# Patient Record
Sex: MALE | Race: BLACK OR AFRICAN AMERICAN | NOT HISPANIC OR LATINO | Employment: FULL TIME | URBAN - NONMETROPOLITAN AREA
[De-identification: names, ages, dates, MRNs, and addresses within clinical notes are randomized per-mention and may not be internally consistent; named-entity substitution may affect disease eponyms.]

---

## 2023-11-07 ENCOUNTER — OFFICE VISIT (OUTPATIENT)
Dept: FAMILY MEDICINE | Facility: CLINIC | Age: 48
End: 2023-11-07
Payer: COMMERCIAL

## 2023-11-07 VITALS
DIASTOLIC BLOOD PRESSURE: 84 MMHG | BODY MASS INDEX: 23.03 KG/M2 | HEIGHT: 72 IN | OXYGEN SATURATION: 96 % | HEART RATE: 74 BPM | TEMPERATURE: 98 F | SYSTOLIC BLOOD PRESSURE: 125 MMHG | WEIGHT: 170 LBS

## 2023-11-07 DIAGNOSIS — R94.31 ABNORMAL ECG: Primary | ICD-10-CM

## 2023-11-07 LAB
ANION GAP SERPL CALCULATED.3IONS-SCNC: 7 MMOL/L (ref 7–16)
BUN SERPL-MCNC: 11 MG/DL (ref 7–18)
BUN/CREAT SERPL: 9 (ref 6–20)
CALCIUM SERPL-MCNC: 9.6 MG/DL (ref 8.5–10.1)
CHLORIDE SERPL-SCNC: 108 MMOL/L (ref 98–107)
CO2 SERPL-SCNC: 29 MMOL/L (ref 21–32)
CREAT SERPL-MCNC: 1.27 MG/DL (ref 0.7–1.3)
EGFR (NO RACE VARIABLE) (RUSH/TITUS): 69 ML/MIN/1.73M2
GLUCOSE SERPL-MCNC: 94 MG/DL (ref 74–106)
MAGNESIUM SERPL-MCNC: 2.5 MG/DL (ref 1.7–2.3)
POTASSIUM SERPL-SCNC: 4.1 MMOL/L (ref 3.5–5.1)
SODIUM SERPL-SCNC: 140 MMOL/L (ref 136–145)

## 2023-11-07 PROCEDURE — 3008F BODY MASS INDEX DOCD: CPT | Mod: ,,, | Performed by: STUDENT IN AN ORGANIZED HEALTH CARE EDUCATION/TRAINING PROGRAM

## 2023-11-07 PROCEDURE — 3074F PR MOST RECENT SYSTOLIC BLOOD PRESSURE < 130 MM HG: ICD-10-PCS | Mod: ,,, | Performed by: STUDENT IN AN ORGANIZED HEALTH CARE EDUCATION/TRAINING PROGRAM

## 2023-11-07 PROCEDURE — 80048 BASIC METABOLIC PNL TOTAL CA: CPT | Mod: ,,, | Performed by: CLINICAL MEDICAL LABORATORY

## 2023-11-07 PROCEDURE — 93005 PR ELECTROCARDIOGRAM, TRACING: ICD-10-PCS | Mod: ,,, | Performed by: STUDENT IN AN ORGANIZED HEALTH CARE EDUCATION/TRAINING PROGRAM

## 2023-11-07 PROCEDURE — 3079F DIAST BP 80-89 MM HG: CPT | Mod: ,,, | Performed by: STUDENT IN AN ORGANIZED HEALTH CARE EDUCATION/TRAINING PROGRAM

## 2023-11-07 PROCEDURE — 3079F PR MOST RECENT DIASTOLIC BLOOD PRESSURE 80-89 MM HG: ICD-10-PCS | Mod: ,,, | Performed by: STUDENT IN AN ORGANIZED HEALTH CARE EDUCATION/TRAINING PROGRAM

## 2023-11-07 PROCEDURE — 80048 BASIC METABOLIC PANEL: ICD-10-PCS | Mod: ,,, | Performed by: CLINICAL MEDICAL LABORATORY

## 2023-11-07 PROCEDURE — 83735 MAGNESIUM: ICD-10-PCS | Mod: ,,, | Performed by: CLINICAL MEDICAL LABORATORY

## 2023-11-07 PROCEDURE — 99203 OFFICE O/P NEW LOW 30 MIN: CPT | Mod: ,,, | Performed by: STUDENT IN AN ORGANIZED HEALTH CARE EDUCATION/TRAINING PROGRAM

## 2023-11-07 PROCEDURE — 1159F PR MEDICATION LIST DOCUMENTED IN MEDICAL RECORD: ICD-10-PCS | Mod: ,,, | Performed by: STUDENT IN AN ORGANIZED HEALTH CARE EDUCATION/TRAINING PROGRAM

## 2023-11-07 PROCEDURE — 93005 ELECTROCARDIOGRAM TRACING: CPT | Mod: ,,, | Performed by: STUDENT IN AN ORGANIZED HEALTH CARE EDUCATION/TRAINING PROGRAM

## 2023-11-07 PROCEDURE — 3008F PR BODY MASS INDEX (BMI) DOCUMENTED: ICD-10-PCS | Mod: ,,, | Performed by: STUDENT IN AN ORGANIZED HEALTH CARE EDUCATION/TRAINING PROGRAM

## 2023-11-07 PROCEDURE — 1159F MED LIST DOCD IN RCRD: CPT | Mod: ,,, | Performed by: STUDENT IN AN ORGANIZED HEALTH CARE EDUCATION/TRAINING PROGRAM

## 2023-11-07 PROCEDURE — 99203 PR OFFICE/OUTPT VISIT, NEW, LEVL III, 30-44 MIN: ICD-10-PCS | Mod: ,,, | Performed by: STUDENT IN AN ORGANIZED HEALTH CARE EDUCATION/TRAINING PROGRAM

## 2023-11-07 PROCEDURE — 3074F SYST BP LT 130 MM HG: CPT | Mod: ,,, | Performed by: STUDENT IN AN ORGANIZED HEALTH CARE EDUCATION/TRAINING PROGRAM

## 2023-11-07 PROCEDURE — 83735 ASSAY OF MAGNESIUM: CPT | Mod: ,,, | Performed by: CLINICAL MEDICAL LABORATORY

## 2023-11-07 NOTE — LETTER
November 7, 2023    Yanick Beckett  [address] [place] [id number]             [facility]  Family Medicine  [address] [place] [id number]  Phone: [phone]  Fax: [phone]   November 7, 2023     Patient: Yanick Beckett   YOB: 1975   Date of Visit: 11/7/2023       To Whom it May Concern:    Yanick Beckett was seen virtually on 11/7/2023. He may return to work on 11/08/2023 .    Please excuse him from any classes or work missed.    If you have any questions or concerns, please don't hesitate to call.    Sincerely,         Blanca Farrell FNP

## 2023-11-07 NOTE — PROGRESS NOTES
[facility]    Chief Complaint      Chief Complaint   Patient presents with    Irregular Heart Beat     States he had stress test done x3 weeks ago and was abnormal. Pt states he had irregular heartbeat. (Clinic in [place], [place] Dr. Tapia's)       History of Present Illness      Yanick Beckett is a 48 y.o. male who presents today for repeat ekg.    Pt in to clinic requesting a repeat ekg, denies having any symptoms.   was seen at a clinic in [place] for an employemnt physical and was found to have an abnormal ecg. States the staff there advised him to get ecg repeated.  Did advise pt that if repeat ecg is abnormal we will have to refer him to cardiology.  Denies having alexia cardiopulmonary symptoms.     Past Medical History:  History reviewed. No pertinent past medical history.    Past Surgical History:   has no past surgical history on file.    Social History:  Social History     Tobacco Use    Smoking status: Never     Passive exposure: Never    Smokeless tobacco: Never   Substance Use Topics    Alcohol use: Never    Drug use: Never       I personally reviewed all past medical, surgical, and social.     Review of Systems   Constitutional:  Negative for fatigue and fever.   HENT:  Negative for sore throat.    Respiratory:  Negative for shortness of breath.    Cardiovascular:  Negative for chest pain.   Gastrointestinal:  Negative for abdominal pain.   Genitourinary:  Negative for dysuria.   Musculoskeletal:  Negative for back pain.   Integumentary:  Negative for rash.   Neurological:  Negative for headaches.   All other systems reviewed and are negative.       Medications:  No outpatient encounter medications on file as of 11/7/2023.     No facility-administered encounter medications on file as of 11/7/2023.       Allergies:  Review of patient's allergies indicates:  No Known Allergies    Health Maintenance:    There is no immunization history on file for this patient.   Health Maintenance   Topic  Date Due    Hepatitis C Screening  Never done    Lipid Panel  Never done    TETANUS VACCINE  Never done    Colorectal Cancer Screening  Never done    Shingles Vaccine (1 of 2) Never done        Physical Exam      Vital Signs  Temp: 98 °F (36.7 °C)  Temp Source: Oral  Pulse: 74  SpO2: 96 %  BP: 125/84  BP Location: Left arm  Patient Position: Sitting  Height and Weight  Height: 6' (182.9 cm)  Weight: 77.1 kg (170 lb)  BSA (Calculated - sq m): 1.98 sq meters  BMI (Calculated): 23.1  Weight in (lb) to have BMI = 25: 183.9]    Physical Exam  Vitals and nursing note reviewed.   Constitutional:       Appearance: Normal appearance.   HENT:      Head: Normocephalic and atraumatic.   Cardiovascular:      Rate and Rhythm: Normal rate and regular rhythm.   Pulmonary:      Effort: Pulmonary effort is normal.      Breath sounds: Normal breath sounds.   Abdominal:      General: Bowel sounds are normal.      Palpations: Abdomen is soft.   Skin:     General: Skin is warm and dry.      Findings: No rash.   Neurological:      General: No focal deficit present.      Mental Status: He is alert and oriented to person, place, and time. Mental status is at baseline.   Psychiatric:         Mood and Affect: Mood normal.         Behavior: Behavior normal.         Thought Content: Thought content normal.         Judgment: Judgment normal.          Laboratory:  CBC:      CMP:  Recent Labs   Lab 11/07/23  1712   Glucose 94   Calcium 9.6   Sodium 140   Potassium 4.1   CO2 29   Chloride 108 H   BUN 11     LIPIDS:      TSH:      A1C:        Assessment/Plan     Yanick Beckett is a 48 y.o.male with:    1. Abnormal ECG  -     POCT EKG 12-LEAD (Manually Resulted by Ordering Provider)  -     Basic Metabolic Panel; Future; Expected date: 11/07/2023  -     Magnesium; Future; Expected date: 11/07/2023         Chronic conditions status updated as per HPI.  Other than changes above, cont current medications and maintain follow up with specialists.  Return  to clinic as needed.     Patient Instructions   We will notify you of your lab results once received.      Blanca Farrell, [facility]

## 2023-11-15 ENCOUNTER — CLINICAL SUPPORT (OUTPATIENT)
Dept: FAMILY MEDICINE | Facility: CLINIC | Age: 48
End: 2023-11-15
Payer: COMMERCIAL

## 2023-11-15 DIAGNOSIS — R94.31 ABNORMAL ECG: Primary | ICD-10-CM

## 2023-11-16 NOTE — PROGRESS NOTES
Pt in requesting one more repeat EKG before considering cardiology consult.  EKG notable for sinus bradycardia with an inverted T  wave.  Will refer to cardiology for further eval.  No acute distress.  Denies chest pain or shortness of breath.  Denies palpitations.

## 2023-12-27 DIAGNOSIS — R94.31 NONSPECIFIC ABNORMAL ELECTROCARDIOGRAM (ECG) (EKG): Primary | ICD-10-CM

## 2024-01-03 ENCOUNTER — OFFICE VISIT (OUTPATIENT)
Dept: CARDIOLOGY | Facility: CLINIC | Age: 49
End: 2024-01-03
Payer: COMMERCIAL

## 2024-01-03 VITALS
OXYGEN SATURATION: 96 % | SYSTOLIC BLOOD PRESSURE: 116 MMHG | WEIGHT: 200 LBS | BODY MASS INDEX: 27.09 KG/M2 | HEIGHT: 72 IN | DIASTOLIC BLOOD PRESSURE: 70 MMHG | HEART RATE: 75 BPM

## 2024-01-03 DIAGNOSIS — I49.8 BRUGADA PATTERN ON ELECTROCARDIOGRAM: Primary | ICD-10-CM

## 2024-01-03 DIAGNOSIS — R94.31 NONSPECIFIC ABNORMAL ELECTROCARDIOGRAM (ECG) (EKG): ICD-10-CM

## 2024-01-03 DIAGNOSIS — Z80.0 FAMILY HISTORY OF COLON CANCER REQUIRING SCREENING COLONOSCOPY: ICD-10-CM

## 2024-01-03 DIAGNOSIS — R94.31 ABNORMAL ELECTROCARDIOGRAM (ECG) (EKG): ICD-10-CM

## 2024-01-03 DIAGNOSIS — R94.31 ABNORMAL ECG: ICD-10-CM

## 2024-01-03 PROCEDURE — 3008F BODY MASS INDEX DOCD: CPT | Mod: S$GLB,,, | Performed by: HOSPITALIST

## 2024-01-03 PROCEDURE — 99214 OFFICE O/P EST MOD 30 MIN: CPT | Mod: PBBFAC | Performed by: HOSPITALIST

## 2024-01-03 PROCEDURE — 3078F DIAST BP <80 MM HG: CPT | Mod: S$GLB,,, | Performed by: HOSPITALIST

## 2024-01-03 PROCEDURE — 93000 ELECTROCARDIOGRAM COMPLETE: CPT | Mod: S$GLB,,, | Performed by: HOSPITALIST

## 2024-01-03 PROCEDURE — 3074F SYST BP LT 130 MM HG: CPT | Mod: S$GLB,,, | Performed by: HOSPITALIST

## 2024-01-03 PROCEDURE — 1159F MED LIST DOCD IN RCRD: CPT | Mod: S$GLB,,, | Performed by: HOSPITALIST

## 2024-01-03 PROCEDURE — 99205 OFFICE O/P NEW HI 60 MIN: CPT | Mod: S$GLB,,, | Performed by: HOSPITALIST

## 2024-01-03 NOTE — LETTER
January 3, 2024      [facility]  [address]  [facility] [address]  Phone: [phone]  Fax: [phone]       Patient: Yanick Beckett   YOB: 1975  Date of Visit: 01/03/2024    To Whom It May Concern:    Sosa Beckett  was at [facility] on 01/03/2024. The patient may return to work/school on 01/04/2023 with no restrictions. If you have any questions or concerns, or if I can be of further assistance, please do not hesitate to contact me.    Sincerely,    Ozzie Chacon MA

## 2024-01-23 ENCOUNTER — HOSPITAL ENCOUNTER (OUTPATIENT)
Dept: RADIOLOGY | Facility: HOSPITAL | Age: 49
Discharge: HOME | End: 2024-01-23
Attending: HOSPITALIST
Payer: COMMERCIAL

## 2024-01-23 ENCOUNTER — HOSPITAL ENCOUNTER (OUTPATIENT)
Dept: CARDIOLOGY | Facility: HOSPITAL | Age: 49
Discharge: HOME | End: 2024-01-23
Attending: HOSPITALIST
Payer: COMMERCIAL

## 2024-01-23 VITALS
HEIGHT: 72 IN | HEIGHT: 72 IN | WEIGHT: 200 LBS | HEART RATE: 65 BPM | BODY MASS INDEX: 27.09 KG/M2 | DIASTOLIC BLOOD PRESSURE: 91 MMHG | BODY MASS INDEX: 27.12 KG/M2 | SYSTOLIC BLOOD PRESSURE: 140 MMHG

## 2024-01-23 DIAGNOSIS — R94.31 NONSPECIFIC ABNORMAL ELECTROCARDIOGRAM (ECG) (EKG): ICD-10-CM

## 2024-01-23 DIAGNOSIS — I49.8 BRUGADA PATTERN ON ELECTROCARDIOGRAM: ICD-10-CM

## 2024-01-23 DIAGNOSIS — R94.31 ABNORMAL ELECTROCARDIOGRAM (ECG) (EKG): ICD-10-CM

## 2024-01-23 LAB
CV STRESS BASE HR: 65 BPM
DIASTOLIC BLOOD PRESSURE: 91 MMHG
OHS CV CPX 1 MINUTE RECOVERY HEART RATE: 100 BPM
OHS CV CPX 85 PERCENT MAX PREDICTED HEART RATE MALE: 145
OHS CV CPX MAX PREDICTED HEART RATE: 170
OHS CV CPX PATIENT IS FEMALE: 0
OHS CV CPX PATIENT IS MALE: 1
OHS CV CPX PEAK DIASTOLIC BLOOD PRESSURE: 111 MMHG
OHS CV CPX PEAK HEAR RATE: 150 BPM
OHS CV CPX PEAK RATE PRESSURE PRODUCT: NORMAL
OHS CV CPX PEAK SYSTOLIC BLOOD PRESSURE: 186 MMHG
OHS CV CPX PERCENT MAX PREDICTED HEART RATE ACHIEVED: 88
OHS CV CPX RATE PRESSURE PRODUCT PRESENTING: 9100
STRESS ECHO POST EXERCISE DUR MIN: 7 MINUTES
STRESS ECHO POST EXERCISE DUR SEC: 35 SECONDS
SYSTOLIC BLOOD PRESSURE: 140 MMHG

## 2024-01-23 PROCEDURE — 93306 TTE W/DOPPLER COMPLETE: CPT

## 2024-01-23 PROCEDURE — 93018 CV STRESS TEST I&R ONLY: CPT | Mod: ,,, | Performed by: HOSPITALIST

## 2024-01-23 PROCEDURE — 93016 CV STRESS TEST SUPVJ ONLY: CPT | Mod: ,,, | Performed by: NURSE PRACTITIONER

## 2024-01-23 PROCEDURE — 93017 CV STRESS TEST TRACING ONLY: CPT

## 2024-01-23 PROCEDURE — 93306 TTE W/DOPPLER COMPLETE: CPT | Mod: 26,,, | Performed by: HOSPITALIST

## 2024-01-23 PROCEDURE — 78452 HT MUSCLE IMAGE SPECT MULT: CPT | Mod: 26,,, | Performed by: HOSPITALIST

## 2024-02-15 LAB
AORTIC ROOT ANNULUS: 3.22 CM
AORTIC VALVE CUSP SEPERATION: 2.27 CM
AV INDEX (PROSTH): 1
AV MEAN GRADIENT: 2 MMHG
AV PEAK GRADIENT: 4 MMHG
AV VALVE AREA BY VELOCITY RATIO: 3.65 CM²
AV VALVE AREA: 4.08 CM²
AV VELOCITY RATIO: 0.89
BSA FOR ECHO PROCEDURE: 2.15 M2
CV ECHO LV RWT: 0.35 CM
DOP CALC AO PEAK VEL: 0.94 M/S
DOP CALC AO VTI: 18.2 CM
DOP CALC LVOT AREA: 4.1 CM2
DOP CALC LVOT DIAMETER: 2.28 CM
DOP CALC LVOT PEAK VEL: 0.84 M/S
DOP CALC LVOT STROKE VOLUME: 74.27 CM3
DOP CALCLVOT PEAK VEL VTI: 18.2 CM
E WAVE DECELERATION TIME: 148.96 MSEC
E/A RATIO: 1.12
E/E' RATIO: 4.96 M/S
ECHO LV POSTERIOR WALL: 0.85 CM (ref 0.6–1.1)
FRACTIONAL SHORTENING: 37 % (ref 28–44)
INTERVENTRICULAR SEPTUM: 0.96 CM (ref 0.6–1.1)
IVC DIAMETER: 1.4 CM
LEFT ATRIUM VOLUME INDEX MOD: 21.4 ML/M2
LEFT ATRIUM VOLUME MOD: 45.64 CM3
LEFT INTERNAL DIMENSION IN SYSTOLE: 3.04 CM (ref 2.1–4)
LEFT VENTRICLE DIASTOLIC VOLUME INDEX: 51.77 ML/M2
LEFT VENTRICLE DIASTOLIC VOLUME: 110.26 ML
LEFT VENTRICLE MASS INDEX: 71 G/M2
LEFT VENTRICLE SYSTOLIC VOLUME INDEX: 17 ML/M2
LEFT VENTRICLE SYSTOLIC VOLUME: 36.19 ML
LEFT VENTRICULAR INTERNAL DIMENSION IN DIASTOLE: 4.85 CM (ref 3.5–6)
LEFT VENTRICULAR MASS: 151.46 G
LV LATERAL E/E' RATIO: 4.19 M/S
LV SEPTAL E/E' RATIO: 6.09 M/S
LVOT MG: 1.68 MMHG
LVOT MV: 0.62 CM/S
MV PEAK A VEL: 0.6 M/S
MV PEAK E VEL: 0.67 M/S
MV STENOSIS PRESSURE HALF TIME: 43.2 MS
MV VALVE AREA P 1/2 METHOD: 5.09 CM2
PISA MRMAX VEL: 2.36 M/S
PISA TR MAX VEL: 2.24 M/S
PV PEAK GRADIENT: 3 MMHG
PV PEAK VELOCITY: 0.9 M/S
RA PRESSURE ESTIMATED: 3 MMHG
RA VOL SYS: 43.82 ML
RIGHT ATRIAL AREA: 18 CM2
RIGHT VENTRICULAR LENGTH IN DIASTOLE (APICAL 4-CHAMBER VIEW): 6.54 CM
RV MID DIAMA: 2.26 CM
RV TB RVSP: 5 MMHG
TDI LATERAL: 0.16 M/S
TDI SEPTAL: 0.11 M/S
TDI: 0.14 M/S
TR MAX PG: 20 MMHG
TRICUSPID ANNULAR PLANE SYSTOLIC EXCURSION: 2.64 CM
TV REST PULMONARY ARTERY PRESSURE: 23 MMHG
Z-SCORE OF LEFT VENTRICULAR DIMENSION IN END DIASTOLE: -3.36
Z-SCORE OF LEFT VENTRICULAR DIMENSION IN END SYSTOLE: -2.44

## 2024-02-27 LAB
OHS QRS DURATION: 78 MS
OHS QTC CALCULATION: 399 MS

## 2024-03-17 PROBLEM — R94.31 ABNORMAL ELECTROCARDIOGRAM (ECG) (EKG): Status: ACTIVE | Noted: 2024-03-17

## 2024-03-17 PROBLEM — I49.8 BRUGADA PATTERN ON ELECTROCARDIOGRAM: Status: ACTIVE | Noted: 2024-03-17

## 2024-03-18 NOTE — PROGRESS NOTES
CARDIOVASCULAR CONSULTATION        REASON FOR CONSULT:   Yanick Beckett is a 49 y.o. male who presents for   Chief Complaint   Patient presents with    Abnormal EKG,      No cardiac complaints.        Results for orders placed or performed in visit on 01/03/24   EKG 12-lead    Collection Time: 01/03/24  8:35 AM   Result Value Ref Range    QRS Duration 78 ms    [facility] QTC Calculation 399 ms    Narrative    Test Reason : R94.31,    Vent. Rate : 067 BPM     Atrial Rate : 067 BPM     P-R Int : 150 ms          QRS Dur : 078 ms      QT Int : 378 ms       P-R-T Axes : 059 056 006 degrees     QTc Int : 399 ms    Normal sinus rhythm  T wave abnormality, consider anterior ischemia  Abnormal ECG  No previous ECGs available  Confirmed by Gene Chung MD ([id number]) on 2/27/2024 12:56:28 AM    Referred By:             Confirmed By:Gene Chung MD       HISTORY OF PRESENT ILLNESS:   49 y.o. male who  has no past medical history on file.    Today we discussed the patient's cardiac symptoms at length - endorsed episodic SSCP worse w/ exertion, improves w/ rest, increasing in freq/severity in recent weeks.     PAST MEDICAL HISTORY:   No past medical history on file.    PAST SURGICAL HISTORY:   No past surgical history on file.    ALLERGIES AND MEDICATION:   Review of patient's allergies indicates:  No Known Allergies     Medication List      as of January 3, 2024 11:59 PM     You have not been prescribed any medications.         SOCIAL HISTORY:     Social History     Socioeconomic History    Marital status: Single   Tobacco Use    Smoking status: Never     Passive exposure: Never    Smokeless tobacco: Never   Substance and Sexual Activity    Alcohol use: Never    Drug use: Never    Sexual activity: Not Currently       FAMILY HISTORY:     Family History   Problem Relation Age of Onset    Diabetes Son        REVIEW OF SYSTEMS:       Cardiology focused 12-point ROS otherwise unremarkable except for as mentioned in HPI and A/P.  "  Pertinent Positives and Negatives documented herein.       PHYSICAL EXAM:     Vitals:    01/03/24 0842   BP: 116/70   Pulse: 75    Body mass index is 27.12 kg/m².  Weight: 90.7 kg (200 lb)   Height: 6' (182.9 cm)     Physical Exam      DATA:     Laboratory:  CBC:        CHEMISTRIES:  Recent Labs   Lab 11/07/23  1712   Glucose 94   Sodium 140   Potassium 4.1   BUN 11   Creatinine 1.27   Calcium 9.6   Magnesium 2.5 H       CARDIAC BIOMARKERS:      No results found for: "BNP"    COAGS:        LIPIDS/LFTS:        No results found for: "HGBA1C"    TSH        The ASCVD Risk score (Drew GA, et al., 2019) failed to calculate for the following reasons:    Cannot find a previous HDL lab    Cannot find a previous total cholesterol lab     IMAGING  No orders to display       ASSESSMENT AND PLAN     Patient Active Problem List   Diagnosis    Abnormal electrocardiogram (ECG) (EKG)    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 Nonspecific abnormal electrocardiogram (ECG) (EKG)  - EKG 12-lead; Future  - EKG 12-lead  - Echo; Future  - Nuclear Stress Test; Future    2. Family history of colon cancer requiring screening colonoscopy  - Ambulatory referral/consult to Gastroenterology; Future    3. Abnormal electrocardiogram (ECG) (EKG)  - NM Myocardial Perfusion Spect Multi Exer; Future    4. Brugada pattern on electrocardiogram - (borderline, does not meet > 2mm criteria, can rarely occur w/ 1-2mm)  - Nuclear Stress Test; Future    5. Abnormal ECG  - Ambulatory referral/consult to Cardiology                This note was dictated with the help of speech recognition software.  There might be un-intended errors and/or substitutions.                "